# Patient Record
Sex: OTHER/UNKNOWN | URBAN - METROPOLITAN AREA
[De-identification: names, ages, dates, MRNs, and addresses within clinical notes are randomized per-mention and may not be internally consistent; named-entity substitution may affect disease eponyms.]

---

## 2019-09-30 ENCOUNTER — TELEPHONE (OUTPATIENT)
Dept: FAMILY MEDICINE | Facility: CLINIC | Age: 29
End: 2019-09-30

## 2019-10-01 NOTE — TELEPHONE ENCOUNTER
"Patient sent a web request today:    \"annual physical check-up.\"    Patient would like to schedule with Dr. Bagley at [facility] Friday mornings.    Please contact patient.    Thank you.    Central Scheduling  Melly AKINS.  "
Called and informed Pt that Dr. Bagley is not accepting new patients.  Pt will call back to schedule with Dr. Goncalves, Dr. Marie or Dr. Jackson  
Dr. Bagley can you accept this patient?  
Sorry, I appreciate the request but I am not able to take Tariq Werner on at this time.    Remi Bagley MD, MD     
Right Foot/Right Hand